# Patient Record
Sex: FEMALE | Race: WHITE | Employment: FULL TIME | URBAN - METROPOLITAN AREA
[De-identification: names, ages, dates, MRNs, and addresses within clinical notes are randomized per-mention and may not be internally consistent; named-entity substitution may affect disease eponyms.]

---

## 2020-08-08 PROBLEM — S52.531A: Status: ACTIVE | Noted: 2020-08-01

## 2023-07-11 ENCOUNTER — TELEPHONE (OUTPATIENT)
Dept: PRIMARY CARE CLINIC | Age: 33
End: 2023-07-11

## 2023-07-11 NOTE — TELEPHONE ENCOUNTER
Patient called. She has [facility] [place].  She will update coverage via [employer] and send a message when it is updated.

## 2023-07-11 NOTE — TELEPHONE ENCOUNTER
Patient needs to update information in chart. Insurance is unable to process due to name change. Need to verify demographics for patient.

## 2023-08-01 ENCOUNTER — TELEMEDICINE (OUTPATIENT)
Dept: BEHAVIORAL/MENTAL HEALTH CLINIC | Age: 33
End: 2023-08-01

## 2023-08-01 DIAGNOSIS — Z81.3 FAMILY HISTORY OF DRUG USE: ICD-10-CM

## 2023-08-01 DIAGNOSIS — F41.9 MILD ANXIETY: Primary | ICD-10-CM

## 2023-08-01 DIAGNOSIS — F32.A MILD DEPRESSION: ICD-10-CM

## 2023-08-01 DIAGNOSIS — Z63.9 FAMILY TENSION: ICD-10-CM

## 2023-08-01 PROCEDURE — 90837 PSYTX W PT 60 MINUTES: CPT | Performed by: COUNSELOR

## 2023-08-01 SDOH — SOCIAL STABILITY - SOCIAL INSECURITY: PROBLEM RELATED TO PRIMARY SUPPORT GROUP, UNSPECIFIED: Z63.9

## 2023-08-01 NOTE — PROGRESS NOTES
anticipating being in the same room as her brother and his fiancee during Thanksgiving and Christmas. She anticipates one of them will \"bring up the drama. \" It is evident that Maude presents with more apathy about the dissolving relationship with Marilee Miller, and specifically mentioned she is most upset that Michael's fiancee referred to her as \"cruel\" during their last confrontation. Raymond Cruz disclosed she was \"very badly bullied\" during childhood by children at school, and would never consider herself cruel to anyone, as a result. Psychoeducation was provided in light of variances in perception vs. Reality given several examples of Maude's behaviors towards her brother's deborae may meet criteria for \"cruel. \" Further, Raymond Cruz identified contemplation phase of motivation to continue attempting to have a relationship with Marilee Miller. Raymond Cruz states she would be willing to communicate to Marilee Miller via text to invite him to upcoming soccer games. Additionally, she is willing to invite her father along, too, as he has been a [place]" to their family drama. She is motivated to bring everyone closer, but admits \"not keeping these boundaries in place reminds me of allowing others to take advantage of me like has happened in the past.\" She did not provide more information; however, alluded this example regards past intimate relationships.      PHQ Scores 6/19/2023 4/30/2021 7/10/2018   PHQ2 Score 3 0 1   PHQ9 Score 6 0 1     Interpretation of Total Score Depression Severity: 1-4 = Minimal depression, 5-9 = Mild depression, 10-14 = Moderate depression, 15-19 = Moderately severe depression, 20-27 = Severe depression    Objective:  Mental Status:  Appearance: age appropriate, casually dressed, and within normal Limits   Affect:  flat   Attitude: Cooperative and Engageable   Mood:   Apathetic   Thought Process:  Linear and goal directed   Delusions: no evidence of delusions   Perceptions: No perceptual disturbance   Behavior:   open and cooperative

## 2023-08-17 ENCOUNTER — OFFICE VISIT (OUTPATIENT)
Dept: INTERNAL MEDICINE CLINIC | Age: 33
End: 2023-08-17

## 2023-08-17 ENCOUNTER — HOSPITAL ENCOUNTER (OUTPATIENT)
Age: 33
Discharge: HOME | End: 2023-08-17
Payer: COMMERCIAL

## 2023-08-17 VITALS
BODY MASS INDEX: 26.68 KG/M2 | HEIGHT: 66 IN | HEART RATE: 62 BPM | OXYGEN SATURATION: 98 % | SYSTOLIC BLOOD PRESSURE: 100 MMHG | WEIGHT: 166 LBS | DIASTOLIC BLOOD PRESSURE: 62 MMHG

## 2023-08-17 DIAGNOSIS — F41.8 ANXIOUS DEPRESSION: Primary | ICD-10-CM

## 2023-08-17 DIAGNOSIS — F41.8 ANXIOUS DEPRESSION: ICD-10-CM

## 2023-08-17 LAB
ALBUMIN SERPL-MCNC: 5.1 G/DL (ref 3.4–5)
ALBUMIN/GLOB SERPL: 1.8 {RATIO} (ref 1.1–2.2)
ALP SERPL-CCNC: 63 U/L (ref 40–129)
ALT SERPL-CCNC: 23 U/L (ref 10–40)
ANION GAP SERPL CALCULATED.3IONS-SCNC: 11 MMOL/L (ref 3–16)
AST SERPL-CCNC: 22 U/L (ref 15–37)
BASOPHILS # BLD: 0 K/UL (ref 0–0.2)
BASOPHILS NFR BLD: 0.6 %
BILIRUB SERPL-MCNC: 0.6 MG/DL (ref 0–1)
BUN SERPL-MCNC: 13 MG/DL (ref 7–20)
CALCIUM SERPL-MCNC: 10.4 MG/DL (ref 8.3–10.6)
CHLORIDE SERPL-SCNC: 101 MMOL/L (ref 99–110)
CO2 SERPL-SCNC: 28 MMOL/L (ref 21–32)
CREAT SERPL-MCNC: 0.6 MG/DL (ref 0.6–1.1)
DEPRECATED RDW RBC AUTO: 13 % (ref 12.4–15.4)
EOSINOPHIL # BLD: 0.1 K/UL (ref 0–0.6)
EOSINOPHIL NFR BLD: 1.6 %
FOLATE SERPL-MCNC: >20 NG/ML (ref 4.78–24.2)
GFR SERPLBLD CREATININE-BSD FMLA CKD-EPI: >60 ML/MIN/{1.73_M2}
GLUCOSE SERPL-MCNC: 96 MG/DL (ref 70–99)
HCT VFR BLD AUTO: 42.2 % (ref 36–48)
HGB BLD-MCNC: 14.5 G/DL (ref 12–16)
LYMPHOCYTES # BLD: 1.7 K/UL (ref 1–5.1)
LYMPHOCYTES NFR BLD: 28.2 %
MCH RBC QN AUTO: 31.1 PG (ref 26–34)
MCHC RBC AUTO-ENTMCNC: 34.2 G/DL (ref 31–36)
MCV RBC AUTO: 90.9 FL (ref 80–100)
MONOCYTES # BLD: 0.5 K/UL (ref 0–1.3)
MONOCYTES NFR BLD: 8.7 %
NEUTROPHILS # BLD: 3.7 K/UL (ref 1.7–7.7)
NEUTROPHILS NFR BLD: 60.9 %
PLATELET # BLD AUTO: 234 K/UL (ref 135–450)
PMV BLD AUTO: 9.1 FL (ref 5–10.5)
POTASSIUM SERPL-SCNC: 4 MMOL/L (ref 3.5–5.1)
PROT SERPL-MCNC: 7.9 G/DL (ref 6.4–8.2)
RBC # BLD AUTO: 4.64 M/UL (ref 4–5.2)
SODIUM SERPL-SCNC: 140 MMOL/L (ref 136–145)
TSH SERPL DL<=0.005 MIU/L-ACNC: 1.13 UIU/ML (ref 0.27–4.2)
VIT B12 SERPL-MCNC: 586 PG/ML (ref 211–911)
WBC # BLD AUTO: 6.1 K/UL (ref 4–11)

## 2023-08-17 PROCEDURE — 99203 OFFICE O/P NEW LOW 30 MIN: CPT | Performed by: INTERNAL MEDICINE

## 2023-08-17 PROCEDURE — 1036F TOBACCO NON-USER: CPT | Performed by: INTERNAL MEDICINE

## 2023-08-17 PROCEDURE — 82607 VITAMIN B-12: CPT

## 2023-08-17 PROCEDURE — 80053 COMPREHEN METABOLIC PANEL: CPT

## 2023-08-17 PROCEDURE — 85025 COMPLETE CBC W/AUTO DIFF WBC: CPT

## 2023-08-17 PROCEDURE — G8419 CALC BMI OUT NRM PARAM NOF/U: HCPCS | Performed by: INTERNAL MEDICINE

## 2023-08-17 PROCEDURE — G8427 DOCREV CUR MEDS BY ELIG CLIN: HCPCS | Performed by: INTERNAL MEDICINE

## 2023-08-17 PROCEDURE — 84443 ASSAY THYROID STIM HORMONE: CPT

## 2023-08-17 PROCEDURE — 82746 ASSAY OF FOLIC ACID SERUM: CPT

## 2023-08-17 PROCEDURE — 36415 COLL VENOUS BLD VENIPUNCTURE: CPT

## 2023-08-17 RX ORDER — CITALOPRAM 20 MG/1
20 TABLET ORAL DAILY
Qty: 30 TABLET | Refills: 5 | Status: SHIPPED | OUTPATIENT
Start: 2023-08-17

## 2023-08-17 NOTE — PATIENT INSTRUCTIONS
Serotonin deficiency with subsequent anxious depression:  START taking citalopram (Celexa) 10 mg (half tablet) daily for a week, then 20 mg (whole tablet) daily. We will check routine blood tests including CBC, TSH (thyroid level), vitamin B12 level, and comprehensive metabolic panel.         Follow-up in two weeks

## 2023-09-05 ENCOUNTER — TELEMEDICINE (OUTPATIENT)
Dept: BEHAVIORAL/MENTAL HEALTH CLINIC | Age: 33
End: 2023-09-05
Payer: COMMERCIAL

## 2023-09-05 DIAGNOSIS — Z81.3 FAMILY HISTORY OF DRUG USE: ICD-10-CM

## 2023-09-05 DIAGNOSIS — F32.A MILD DEPRESSION: ICD-10-CM

## 2023-09-05 DIAGNOSIS — F41.9 MILD ANXIETY: Primary | ICD-10-CM

## 2023-09-05 DIAGNOSIS — Z63.9 FAMILY TENSION: ICD-10-CM

## 2023-09-05 PROCEDURE — 1036F TOBACCO NON-USER: CPT | Performed by: COUNSELOR

## 2023-09-05 PROCEDURE — 90837 PSYTX W PT 60 MINUTES: CPT | Performed by: COUNSELOR

## 2023-09-05 SDOH — SOCIAL STABILITY - SOCIAL INSECURITY: PROBLEM RELATED TO PRIMARY SUPPORT GROUP, UNSPECIFIED: Z63.9

## 2023-09-05 NOTE — PROGRESS NOTES
3. Family tension        4. Family history of drug use            Plan/Recommendations:  Continue clinical therapeutic treatment using evidence-based techniques for depression, anxiety, and family tension. Primary goals of therapy include decrease symptoms of anxiety and depression; improve communication skills with brother and biological parents, creating appropriate boundaries, as needed. Continue use of Cognitive Behavioral therapy to challenge irrational thoughts, Psychoanalysis for insight development, and Dialectical Behavioral Therapy to address emotional management.     Electronically signed by Paco Vasquez [facility] A[facility] [place], [facility]  Licensed Professional Clinical Counselor  Director of [address] [place] [address] and [facility] Medicine Residency Program at [facility]

## 2024-02-08 ENCOUNTER — TELEPHONE (OUTPATIENT)
Dept: INTERNAL MEDICINE CLINIC | Age: 34
End: 2024-02-08

## 2024-02-15 RX ORDER — CITALOPRAM 20 MG/1
20 TABLET ORAL DAILY
Qty: 30 TABLET | Refills: 5 | Status: SHIPPED | OUTPATIENT
Start: 2024-02-15

## 2024-02-15 NOTE — TELEPHONE ENCOUNTER
Requested Prescriptions     Pending Prescriptions Disp Refills    citalopram (CELEXA) 20 MG tablet 30 tablet 5     Sig: Take 1 tablet by mouth daily

## 2024-02-16 ENCOUNTER — TELEMEDICINE (OUTPATIENT)
Dept: BEHAVIORAL/MENTAL HEALTH CLINIC | Age: 34
End: 2024-02-16
Payer: COMMERCIAL

## 2024-02-16 DIAGNOSIS — Z63.9 FAMILY TENSION: ICD-10-CM

## 2024-02-16 DIAGNOSIS — F32.A MILD DEPRESSION: ICD-10-CM

## 2024-02-16 DIAGNOSIS — Z81.3 FAMILY HISTORY OF DRUG USE: ICD-10-CM

## 2024-02-16 DIAGNOSIS — F41.9 MILD ANXIETY: Primary | ICD-10-CM

## 2024-02-16 PROCEDURE — 90837 PSYTX W PT 60 MINUTES: CPT | Performed by: COUNSELOR

## 2024-02-16 PROCEDURE — 1036F TOBACCO NON-USER: CPT | Performed by: COUNSELOR

## 2024-02-16 SDOH — SOCIAL STABILITY - SOCIAL INSECURITY: PROBLEM RELATED TO PRIMARY SUPPORT GROUP, UNSPECIFIED: Z63.9

## 2024-02-16 NOTE — PROGRESS NOTES
Behavioral Health Assessment  [facility]    Time Start: 10:00pm    Time End:  11:00pm   Date of Service:  2/16/2024    Virtual Section:  Maude Anderson is a 34 yo female participating in a clinical Virtual Visit (video visit) encounter via [employer] telemedicine platform to address concerns as mentioned above below. Pursuant to the emergency declaration under the Holder Act and the National Emergencies Act, 1135 waiver authority and the Coronavirus Preparedness and Response Supplemental Appropriations Act, this Virtual Visit was conducted with patient's consent, to reduce the patient's risk of exposure to COVID-19 and provide necessary medical care. The patient has also been advised to contact this office for worsening conditions or problems, and seek emergency medical treatment and/or call 911 if deemed necessary.     Patient identification was verified at the start of the visit: Yes     Services were provided through a video synchronous discussion virtually to substitute for in-person clinic visit. Patient was located at home in dining room ([address] [place] [address]) and provider was located in office. [employer] online virtual platform used for telemedicine therapy services.    Subjective:  Maude reports worsening in her anxiety and depression symptoms since the previous session.  During the session, various topics pertinent to her mental and behavioral health were explored.    Family Stress:  Maude shares updates regarding her family, including her grandmother's recent fall, which  fortunately resulted in her grandmother being in stable condition. She expresses frustration over  social media interactions, particularly a friend request (on [email or web address]) from Anjana. Maude also  discusses her discomfort with Anjana's frequent updates and feels excluded from family  events despite being informed otherwise.    Relationship with Younger Brother (Óscar):  Maude reflects on her

## 2024-06-24 ENCOUNTER — TELEPHONE (OUTPATIENT)
Dept: INTERNAL MEDICINE CLINIC | Age: 34
End: 2024-06-24

## 2024-06-24 RX ORDER — CITALOPRAM 20 MG/1
20 TABLET ORAL DAILY
Qty: 30 TABLET | Refills: 5 | Status: SHIPPED | OUTPATIENT
Start: 2024-06-24

## 2024-07-02 RX ORDER — CITALOPRAM 20 MG/1
20 TABLET ORAL DAILY
Qty: 30 TABLET | Refills: 5 | Status: SHIPPED | OUTPATIENT
Start: 2024-07-02

## 2024-07-02 NOTE — TELEPHONE ENCOUNTER
[facility]    Requested Prescriptions     Pending Prescriptions Disp Refills    citalopram (CELEXA) 20 MG tablet 30 tablet 5     Sig: Take 1 tablet by mouth daily

## 2024-11-18 ENCOUNTER — TELEMEDICINE (OUTPATIENT)
Dept: BEHAVIORAL/MENTAL HEALTH CLINIC | Age: 34
End: 2024-11-18
Payer: COMMERCIAL

## 2024-11-18 DIAGNOSIS — Z81.3 FAMILY HISTORY OF DRUG USE: ICD-10-CM

## 2024-11-18 DIAGNOSIS — Z63.9 FAMILY TENSION: ICD-10-CM

## 2024-11-18 DIAGNOSIS — F32.A MILD DEPRESSION: ICD-10-CM

## 2024-11-18 DIAGNOSIS — Z63.0 MARITAL STRESS: ICD-10-CM

## 2024-11-18 DIAGNOSIS — F41.9 MILD ANXIETY: Primary | ICD-10-CM

## 2024-11-18 PROCEDURE — 90837 PSYTX W PT 60 MINUTES: CPT | Performed by: COUNSELOR

## 2024-11-18 SDOH — SOCIAL STABILITY - SOCIAL INSECURITY: PROBLEM RELATED TO PRIMARY SUPPORT GROUP, UNSPECIFIED: Z63.9

## 2024-11-18 SDOH — SOCIAL STABILITY - SOCIAL INSECURITY: PROBLEMS IN RELATIONSHIP WITH SPOUSE OR PARTNER: Z63.0

## 2024-11-18 NOTE — PROGRESS NOTES
Behavioral Health Assessment  [facility]    Time Start: 1:31pm    Time End:  2:31pm   Date of Service:  11/18/2024    Virtual Section:  Maude Anderson is a 35 yo female participating in a clinical Virtual Visit (video visit) encounter via [employer] telemedicine platform to address concerns as mentioned above below. Pursuant to the emergency declaration under the Holder Act and the National Emergencies Act, 1135 waiver authority and the Coronavirus Preparedness and Response Supplemental Appropriations Act, this Virtual Visit was conducted with patient's consent, to reduce the patient's risk of exposure to COVID-19 and provide necessary medical care. The patient has also been advised to contact this office for worsening conditions or problems, and seek emergency medical treatment and/or call 911 if deemed necessary.     Patient identification was verified at the start of the visit: Yes     Services were provided through a video synchronous discussion virtually to substitute for in-person clinic visit. Patient was located at home in dining room ([address] [place] [address]) and provider was located in office. [employer] online virtual platform used for telemedicine therapy services.    Brief summary of session: The session involved a detailed discussion about the client's recent life changes, including a move to a new home, relationship dynamics with her partner, and family issues. The client expressed feelings of stress and emotional exhaustion due to these changes. Key emotions included frustration, sadness, and a sense of being overwhelmed. Significant interventions included active listening, validation of feelings, and exploring communication strategies. New insights were gained about the impact of family dynamics on the client's mental health. Coping strategies discussed included setting boundaries and finding personal activities for fulfillment. Clinical impressions suggest